# Patient Record
(demographics unavailable — no encounter records)

---

## 2024-11-07 NOTE — ASSESSMENT
[FreeTextEntry1] : 84-year-old female with ILD, fibrotic NSIP pattern here for follow up visit after recent hospitalization.  CT chest consistent with fibrotic NSIP pattern and labs notable for mildly +DIMA, p-ANCA, SS-A, and anticardiolipin Ab. Most recent imaging in hospital of CT abd performed 4/2024 shows findings in chest consistent with fluid overload. Will repeat CT chest. Follow up repeat TTE. Oxygen supplementation encouraged with exertion. Check nocturnal oximetry. UTD with flu shot.  Follow up in 3 months, sooner if needed.

## 2024-11-07 NOTE — PHYSICAL EXAM
[No Acute Distress] : no acute distress [Normal Oropharynx] : normal oropharynx [II] : Mallampati Class: II [Normal Appearance] : normal appearance [No Neck Mass] : no neck mass [Normal Rate/Rhythm] : normal rate/rhythm [Normal S1, S2] : normal s1, s2 [No Murmurs] : no murmurs [No Resp Distress] : no resp distress [No Abnormalities] : no abnormalities [Normal Gait] : normal gait [No Clubbing] : no clubbing [No Cyanosis] : no cyanosis [No Edema] : no edema [FROM] : FROM [Normal Color/ Pigmentation] : normal color/ pigmentation [No Focal Deficits] : no focal deficits [Oriented x3] : oriented x3 [Normal Affect] : normal affect [TextBox_68] : Fine crackles at bases.

## 2024-11-07 NOTE — HISTORY OF PRESENT ILLNESS
[Never] : never [TextBox_4] : 84-year-old female here for follow up of ILD, HF and pulmonary HTN, likely group II, possible component of group III.  Comorbid medical conditions include hypertension, hyperlipidemia, atrial fibrillation on apixaban, coronary artery disease status post stent, diabetes mellitus. Admitted to [facility] 4/26 - 5/15 for dysuria, found to have E.coli bacteremia with right hydronephrosis and pyonephrosis s/p cystoscopy and right ureteral stent placement (5/1).  CT chest (1/24/2024) Fibrotic NSIP   Not using oxygen but reports dyspnea with exertion. No LE edema.

## 2024-11-07 NOTE — REASON FOR VISIT
[Abnormal CXR/ Chest CT] : an abnormal CXR/ chest CT [Shortness of Breath] : shortness of breath [ILD] : ILD [Follow-Up] : a follow-up visit

## 2024-11-07 NOTE — REVIEW OF SYSTEMS
[SOB on Exertion] : sob on exertion [Diabetes] : diabetes [Negative] : Constitutional [Dyspnea] : no dyspnea [Raynaud] : no raynaud [Rash] : no rash [TextBox_94] : Denies joint pain in hands but has lower extremity weakness and arthritis

## 2024-11-15 NOTE — PHYSICAL EXAM
[Normal Rate] : normal [Irregularly Irregular] : irregularly irregular [Normal S1] : normal S1 [Normal S2] : normal S2 [No Murmur] : no murmurs heard [No Pitting Edema] : no pitting edema present [2+] : left 2+ [Normal] : alert and oriented, normal memory

## 2024-11-15 NOTE — HISTORY OF PRESENT ILLNESS
[FreeTextEntry1] : Ms. Gonzalez is referred for evaluation of tricuspid regurgitation. She has a medical history of CAD with PCI in 2019 and a LHC in 2021 which was nonobstructive, HTN, afib, HLD, DM and pulmonary fibrosis. She was admitted this past May with sepsis from E.Coli bacteremia resulting from a UTI. She had a right ureteral stent placed and was treated with antibiotics. She also was treated for MELANY and right heart failure. She was managed by the heart failure team and offered to follow up outpatient, which she deferred. She continues to follow up with urology (Dr. Hightower) and pulmonology (Dr. Penn).  She is accompanied today by her son who provides additional information and serves as a  as is her preference (primary language is Japanese). She has noted progressive dyspnea on exertion, worsening recently. She feels sob with minimal exertion which is altering her previously active lifestyle. She does not get leg swelling. She takes lasix three times a week and recently had metolazone added for a 2 week course (completed today) which resulted in only minimal improvement in symptoms. Dr. Penn recommended use of home O2 but she is not compliant with this.   NYHA III   She had an echocardiogram on 4/29/2024 which showed severe TR with mild/moderate pulmonary HTN and severe RV dysfunction.  RHC was done on 5/14.  SHe was advised to use oxygen with activities but she is not compliant, she finds it heavy and cumbersome, per her son. He notes she becomes SOB with minimal activity, such as walking short distances. She follows with Dr Tre Vora and 2 weeks ago he added Metolazone QOD for 2 weeks, which she completed today--with no improvement in her breathing. She had stents in her proximal and mid LAD in 2019 at [facility] with Dr Gonzalez, which her son states were "elective" after having an abnormal CT. She had some nausea the day before yesterday, which is how she presented with a recent UTI; she has no fevers, chills, or dysuria. She describes her appetite as "not bad" and her weight has been stable at 130 pounds.

## 2024-11-15 NOTE — REVIEW OF SYSTEMS
[SOB] : shortness of breath [Dyspnea on exertion] : dyspnea during exertion [Negative] : Psychiatric [Lower Ext Edema] : no extremity edema

## 2024-11-15 NOTE — HISTORY OF PRESENT ILLNESS
[FreeTextEntry1] : Ms. Gonzalez is referred for evaluation of tricuspid regurgitation. She has a medical history of CAD with PCI in 2019 and a LHC in 2021 which was nonobstructive, HTN, afib, HLD, DM and pulmonary fibrosis. She was admitted this past May with sepsis from E.Coli bacteremia resulting from a UTI. She had a right ureteral stent placed and was treated with antibiotics. She also was treated for MELNAY and right heart failure. She was managed by the heart failure team and offered to follow up outpatient, which she deferred. She continues to follow up with urology (Dr. Hightower) and pulmonology (Dr. Penn).  She is accompanied today by her son who provides additional information and serves as a  as is her preference (primary language is Ukrainian). She has noted progressive dyspnea on exertion, worsening recently. She feels sob with minimal exertion which is altering her previously active lifestyle. She does not get leg swelling. She takes lasix three times a week and recently had metolazone added for a 2 week course (completed today) which resulted in only minimal improvement in symptoms. Dr. Penn recommended use of home O2 but she is not compliant with this.   NYHA III   She had an echocardiogram on 4/29/2024 which showed severe TR with mild/moderate pulmonary HTN and severe RV dysfunction.  RHC was done on 5/14.  SHe was advised to use oxygen with activities but she is not compliant, she finds it heavy and cumbersome, per her son. He notes she becomes SOB with minimal activity, such as walking short distances. She follows with Dr Tre Vora and 2 weeks ago he added Metolazone QOD for 2 weeks, which she completed today--with no improvement in her breathing. She had stents in her proximal and mid LAD in 2019 at [facility] with Dr Gonzalez, which her son states were "elective" after having an abnormal CT. She had some nausea the day before yesterday, which is how she presented with a recent UTI; she has no fevers, chills, or dysuria. She describes her appetite as "not bad" and her weight has been stable at 130 pounds.

## 2025-01-17 NOTE — PHYSICAL EXAM
[Well Developed] : well developed [Well Nourished] : well nourished [No Acute Distress] : no acute distress [Normal Venous Pressure] : normal venous pressure [Normal S1, S2] : normal S1, S2 [No Murmur] : no murmur [No Rub] : no rub [No Gallop] : no gallop [Clear Lung Fields] : clear lung fields [Good Air Entry] : good air entry [Soft] : abdomen soft [Non Tender] : non-tender [No Edema] : no edema [No Rash] : no rash [Moves all extremities] : moves all extremities [Normal Speech] : normal speech [Alert and Oriented] : alert and oriented [Normal memory] : normal memory [de-identified] : irregularly irregular

## 2025-01-17 NOTE — HISTORY OF PRESENT ILLNESS
[FreeTextEntry1] : Lasha Gonzalez is an 84-year-old Wolof-speaking woman with interstitial lung disease, hypertension, hyperlipidemia, diabetes, coronary artery disease (status post a percutaneous coronary intervention to the proximal and mid left anterior descending artery in 2019) and permanent atrial fibrillation. She presents today for further management of permanent atrial fibrillation   Her most recent transthoracic echocardiogram from April of 2024 demonstrated a preserved left ventricular systolic function with severely dilated left and right atria, a severely dilated right ventricle with severely reduced right ventricular systolic function, severe tricuspid regurgitation and pulmonary hypertension. Every electrocardiogram in [employer] dating back to 2019 demonstrates atrial fibrillation. Her family reports that she has had atrial fibrillation for the past ten years. She has never undergone any prior attempts at rhythm control. Her Metoprolol dose was increased to 50mg twice daily, but she is unable to tolerate further upward titration as higher doses affected her blood pressure. She still has poorly controlled ventricular rates with resting heart rates around 100 beats per minute. Per her son, her heart rate increases to 120-130 beats per minute with minimal exertion.  She reports symptoms of shortness of breath and palpitations. No reports of chest pain, dizziness, lightheadedness, pre-syncope or syncope. She is able to walk for ten minutes without stopping.  She is being evaluated by Dr. Dickson for severe tricuspid regurgitation. She is being considered for a percutaneous tricuspid intervention. She will be requiring a CT scan however prior. If her heart rates are elevated during this test may be challenging to obtain optimal images per the structural team.   CHADS2-VASC: 5 (Age: 2, F: 1, HTN: 1, DM: 1). She is taking Eliquis 2.5mg twice daily (Age > 80, weight < 60kg)

## 2025-01-17 NOTE — PHYSICAL EXAM
[Well Developed] : well developed [Well Nourished] : well nourished [No Acute Distress] : no acute distress [Normal Venous Pressure] : normal venous pressure [Normal S1, S2] : normal S1, S2 [No Murmur] : no murmur [No Rub] : no rub [No Gallop] : no gallop [Clear Lung Fields] : clear lung fields [Good Air Entry] : good air entry [Soft] : abdomen soft [Non Tender] : non-tender [No Edema] : no edema [No Rash] : no rash [Moves all extremities] : moves all extremities [Normal Speech] : normal speech [Alert and Oriented] : alert and oriented [Normal memory] : normal memory [de-identified] : irregularly irregular

## 2025-01-17 NOTE — HISTORY OF PRESENT ILLNESS
[FreeTextEntry1] : Lasha Gonzalez is an 84-year-old Icelandic-speaking woman with interstitial lung disease, hypertension, hyperlipidemia, diabetes, coronary artery disease (status post a percutaneous coronary intervention to the proximal and mid left anterior descending artery in 2019) and permanent atrial fibrillation. She presents today for further management of permanent atrial fibrillation   Her most recent transthoracic echocardiogram from April of 2024 demonstrated a preserved left ventricular systolic function with severely dilated left and right atria, a severely dilated right ventricle with severely reduced right ventricular systolic function, severe tricuspid regurgitation and pulmonary hypertension. Every electrocardiogram in [facility] dating back to 2019 demonstrates atrial fibrillation. Her family reports that she has had atrial fibrillation for the past ten years. She has never undergone any prior attempts at rhythm control. Her Metoprolol dose was increased to 50mg twice daily, but she is unable to tolerate further upward titration as higher doses affected her blood pressure. She still has poorly controlled ventricular rates with resting heart rates around 100 beats per minute. Per her son, her heart rate increases to 120-130 beats per minute with minimal exertion.  She reports symptoms of shortness of breath and palpitations. No reports of chest pain, dizziness, lightheadedness, pre-syncope or syncope. She is able to walk for ten minutes without stopping.  She is being evaluated by Dr. Dickson for severe tricuspid regurgitation. She is being considered for a percutaneous tricuspid intervention. She will be requiring a CT scan however prior. If her heart rates are elevated during this test may be challenging to obtain optimal images per the structural team.   CHADS2-VASC: 5 (Age: 2, F: 1, HTN: 1, DM: 1). She is taking Eliquis 2.5mg twice daily (Age > 80, weight < 60kg)

## 2025-01-17 NOTE — CARDIOLOGY SUMMARY
[de-identified] : ECG from 1/17/2024: Atrial fibrillation  ECG from 9/27/2024: Atrial fibrillation with a ventricular rate of 107 beats per minute ECG from 4/22/2019: AF at 82bpm [de-identified] : TTE from 4/29/2024: EF: 54%, severely enlarged RV size and severely reduced RVSF, severe LA dilation, severe RA dilation, trace MR, severe TR, mod pHTN, mild-mod PI, no pericardial effusion [de-identified] : CT Chest from 1/24/2024: Fibrotic interstitial lung disease; question fibrotic NSIP pattern. Coronary arterial and right heart enlargement, concerning for pulmonary hypertension.  [de-identified] : [facility] from 12/13/2023: Patent LAD and RCA stents. Mild RCA and LCx disease. Very distal LCx, (<2mm is diameter) has severe disease that is unchanged from prior films   C from 6/4/2019 (Dr. Ga Gonzalez): PTCA of mLAD (GHANSHYAM), PTCA of pLAD (GHANSHYAM)

## 2025-01-17 NOTE — REASON FOR VISIT
[Patient Declined  Services] : - None: Patient declined  services [Family Member] : family member [FreeTextEntry3] : Dr. Tre Vora [FreeTextEntry1] : Pulmonary: Dr. Alexa Penn  Her daughter-in-law (Salinas Carlson) is a nurse in the Electrophysiology lab at [facility]. She translated today per the patient's preference

## 2025-01-17 NOTE — END OF VISIT
[Time Spent: ___ minutes] : I have spent [unfilled] minutes of time on the encounter which excludes teaching and separately reported services. [FreeTextEntry3] : Patient seen with ETHEL Flores. Permanent AF. Not able to achieve adequate rate control with B-blockers due to limitations of blood pressure. Plan for AVJ and leadless pacemaker then possible tricuspid valve intervention.

## 2025-01-17 NOTE — DISCUSSION/SUMMARY
[FreeTextEntry1] : Lasha Gonzalez is an 84-year-old Upper sorbian-speaking woman with interstitial lung disease, hypertension, hyperlipidemia, diabetes, coronary artery disease (status post a percutaneous coronary intervention to the proximal and mid left anterior descending artery in 2019) and permanent atrial fibrillation. She presents today for ongoing discussions regarding atrial fibrillation management.   Given that she has been in atrial fibrillation for more than ten years rhythm control will be very challenging. Given her interstitial lung disease and her other comorbidities, she is a poor candidate for antiarrhythmic medications or an atrial fibrillation ablation. We recommended a rate control strategy however we are unable to achieve this through AV ang blocking agents due to hypotension. Therefore, we recommend an AVJ ablation along with a leadless pacemaker. A leadless pacemaker will not interfere with her possible future tricuspid valve intervention. Her left ventricular systolic function is 54%. The risks and benefits were discussed regarding the procedure. After all questions, the patient and her family are in agreement to proceed with the AVJ ablation and leadless pacemaker implant. We will aim to schedule her AVJ ablation and leadless pacemaker implant prior to her scheduled CT scan on 2/4 (controlled rates will help with imaging) for her severe tricuspid regurgitation work-up with Dr. Trevor Dickson.   The office will contact her for scheduling purposes.  [EKG obtained to assist in diagnosis and management of assessed problem(s)] : EKG obtained to assist in diagnosis and management of assessed problem(s)

## 2025-01-17 NOTE — DISCUSSION/SUMMARY
[FreeTextEntry1] : Lasha Gonzalez is an 84-year-old Tamaz[place]-speaking woman with interstitial lung disease, hypertension, hyperlipidemia, diabetes, coronary artery disease (status post a percutaneous coronary intervention to the proximal and mid left anterior descending artery in 2019) and permanent atrial fibrillation. She presents today for ongoing discussions regarding atrial fibrillation management.   Given that she has been in atrial fibrillation for more than ten years rhythm control will be very challenging. Given her interstitial lung disease and her other comorbidities, she is a poor candidate for antiarrhythmic medications or an atrial fibrillation ablation. We recommended a rate control strategy however we are unable to achieve this through AV ang blocking agents due to hypotension. Therefore, we recommend an AVJ ablation along with a leadless pacemaker. A leadless pacemaker will not interfere with her possible future tricuspid valve intervention. Her left ventricular systolic function is 54%. The risks and benefits were discussed regarding the procedure. After all questions, the patient and her family are in agreement to proceed with the AVJ ablation and leadless pacemaker implant. We will aim to schedule her AVJ ablation and leadless pacemaker implant prior to her scheduled CT scan on 2/4 (controlled rates will help with imaging) for her severe tricuspid regurgitation work-up with Dr. Trevor Dickson.   The office will contact her for scheduling purposes.  [EKG obtained to assist in diagnosis and management of assessed problem(s)] : EKG obtained to assist in diagnosis and management of assessed problem(s)

## 2025-01-17 NOTE — REASON FOR VISIT
[Patient Declined  Services] : - None: Patient declined  services [Family Member] : family member [FreeTextEntry3] : Dr. Tre Vora [FreeTextEntry1] : Pulmonary: Dr. Alexa Penn  Her daughter-in-law (Salinas Carlson) is a nurse in the Electrophysiology lab at [facility]. She translated today per the patient's preference

## 2025-01-17 NOTE — CARDIOLOGY SUMMARY
[de-identified] : ECG from 1/17/2024: Atrial fibrillation  ECG from 9/27/2024: Atrial fibrillation with a ventricular rate of 107 beats per minute ECG from 4/22/2019: AF at 82bpm [de-identified] : TTE from 4/29/2024: EF: 54%, severely enlarged RV size and severely reduced RVSF, severe LA dilation, severe RA dilation, trace MR, severe TR, mod pHTN, mild-mod PI, no pericardial effusion [de-identified] : CT Chest from 1/24/2024: Fibrotic interstitial lung disease; question fibrotic NSIP pattern. Coronary arterial and right heart enlargement, concerning for pulmonary hypertension.  [de-identified] : [facility] from 12/13/2023: Patent LAD and RCA stents. Mild RCA and LCx disease. Very distal LCx, (<2mm is diameter) has severe disease that is unchanged from prior films   C from 6/4/2019 (Dr. Ga Gonzalez): PTCA of mLAD (GHANSHYAM), PTCA of pLAD (GHANSHYAM)